# Patient Record
Sex: MALE | Race: BLACK OR AFRICAN AMERICAN | Employment: FULL TIME | URBAN - METROPOLITAN AREA
[De-identification: names, ages, dates, MRNs, and addresses within clinical notes are randomized per-mention and may not be internally consistent; named-entity substitution may affect disease eponyms.]

---

## 2024-04-11 ENCOUNTER — OFFICE VISIT (OUTPATIENT)
Age: 34
End: 2024-04-11

## 2024-04-11 ENCOUNTER — APPOINTMENT (OUTPATIENT)
Age: 34
End: 2024-04-11
Payer: COMMERCIAL

## 2024-04-11 VITALS
HEIGHT: 73 IN | SYSTOLIC BLOOD PRESSURE: 126 MMHG | WEIGHT: 266.6 LBS | BODY MASS INDEX: 35.33 KG/M2 | DIASTOLIC BLOOD PRESSURE: 82 MMHG | OXYGEN SATURATION: 97 % | HEART RATE: 81 BPM

## 2024-04-11 DIAGNOSIS — Z13.6 ENCOUNTER FOR LIPID SCREENING FOR CARDIOVASCULAR DISEASE: ICD-10-CM

## 2024-04-11 DIAGNOSIS — Z11.4 SCREENING FOR HIV (HUMAN IMMUNODEFICIENCY VIRUS): ICD-10-CM

## 2024-04-11 DIAGNOSIS — Z13.220 ENCOUNTER FOR LIPID SCREENING FOR CARDIOVASCULAR DISEASE: ICD-10-CM

## 2024-04-11 DIAGNOSIS — Z11.59 NEED FOR HEPATITIS C SCREENING TEST: ICD-10-CM

## 2024-04-11 DIAGNOSIS — S43.401D SPRAIN OF RIGHT SHOULDER, UNSPECIFIED SHOULDER SPRAIN TYPE, SUBSEQUENT ENCOUNTER: ICD-10-CM

## 2024-04-11 DIAGNOSIS — Z11.59 NEED FOR HEPATITIS C SCREENING TEST: Primary | ICD-10-CM

## 2024-04-11 DIAGNOSIS — S16.1XXD NECK STRAIN, SUBSEQUENT ENCOUNTER: ICD-10-CM

## 2024-04-11 LAB
CHOLEST SERPL-MCNC: 208 MG/DL
GLUCOSE P FAST SERPL-MCNC: 78 MG/DL (ref 65–99)
HCV AB SER QL: NORMAL
HDLC SERPL-MCNC: 50 MG/DL
HIV 1+2 AB+HIV1 P24 AG SERPL QL IA: NORMAL
HIV 2 AB SERPL QL IA: NORMAL
HIV1 AB SERPL QL IA: NORMAL
HIV1 P24 AG SERPL QL IA: NORMAL
LDLC SERPL CALC-MCNC: 125 MG/DL (ref 0–100)
NONHDLC SERPL-MCNC: 158 MG/DL
TRIGL SERPL-MCNC: 164 MG/DL

## 2024-04-11 PROCEDURE — 86803 HEPATITIS C AB TEST: CPT

## 2024-04-11 PROCEDURE — 82947 ASSAY GLUCOSE BLOOD QUANT: CPT

## 2024-04-11 PROCEDURE — 36415 COLL VENOUS BLD VENIPUNCTURE: CPT

## 2024-04-11 PROCEDURE — 80061 LIPID PANEL: CPT

## 2024-04-11 PROCEDURE — 87389 HIV-1 AG W/HIV-1&-2 AB AG IA: CPT

## 2024-04-11 RX ORDER — NAPROXEN 250 MG/1
250 TABLET ORAL
COMMUNITY

## 2024-04-11 NOTE — PROGRESS NOTES
"Name: Jamar Hampton      : 1990      MRN: [id number]  Encounter Provider: Brijesh Capellan MD  Encounter Date: 2024   Encounter department: [facility]    Assessment & Plan     1. Need for hepatitis C screening test  Comments:  Hep. C  Orders:  -     Hepatitis C Antibody; Future    2. Screening for HIV (human immunodeficiency virus)  Comments:  HIV test  Orders:  -     HIV 1/2 AG/AB w Reflex [facility] for 2 yr old and above; Future    3. Neck strain, subsequent encounter  Comments:  Continue P.T. On FMLA. Naprosyn. Heat.    4. Sprain of right shoulder, unspecified shoulder sprain type, subsequent encounter  Comments:  Continue P.T. On FMLA. Naprosyn. Heat.    5. Encounter for lipid screening for cardiovascular disease  Comments:  Labs ordered.  Orders:  -     Glucose, fasting; Future  -     Lipid panel; Future           Subjective      Here for follow up of neck and right shoulder pain. Working at [employer]. Loads and lifts weight. Getting therapy at [facility].  Sees Dr. Walker chiroprator in past. Shoulder and neck better. Taking naprosyn as needed. On FMLA      Review of Systems   Constitutional:  Negative for fatigue.   Respiratory:  Negative for shortness of breath.    Cardiovascular:  Negative for chest pain.   Gastrointestinal:  Negative for abdominal pain, constipation and diarrhea.   Genitourinary:  Negative for dysuria.   Musculoskeletal:         Neck and right shoulder pain   Neurological:  Negative for dizziness.       Current Outpatient Medications on File Prior to Visit   Medication Sig   • naproxen (NAPROSYN) 250 mg tablet Take 250 mg by mouth (Patient not taking: Reported on 2024)       Objective     /82 (BP Location: Left arm, Patient Position: Sitting, Cuff Size: Standard)   Pulse 81   Ht 6' 1\" (1.854 m)   Wt 121 kg (266 lb 9.6 oz)   SpO2 97%   BMI 35.17 kg/m²     Physical Exam  HENT:      Head: Normocephalic.   Eyes:      Conjunctiva/sclera: " Conjunctivae normal.   Cardiovascular:      Rate and Rhythm: Normal rate and regular rhythm.   Pulmonary:      Breath sounds: Normal breath sounds.   Abdominal:      General: Abdomen is flat. Bowel sounds are normal.      Palpations: Abdomen is soft.   Musculoskeletal:      Comments: Tender right trapezius area and shoulder. Limited ROM right shoulder.   Neurological:      General: No focal deficit present.      Mental Status: He is alert.       Brijesh Capellan MD